# Patient Record
(demographics unavailable — no encounter records)

---

## 2025-06-24 NOTE — HISTORY OF PRESENT ILLNESS
[FreeTextEntry1] : REASON FOR VISIT: Ms. Sandra Davila is a 29-year-old  female of -[place] descent with a gestational age of 13 weeks and 3 days based on an MICHELLE of 2025 who was seen for genetic counseling through [facility] on 2025 to review her comprehensive carrier screening. The patient was accompanied by her sister.   RELEVANT MEDICAL AND SURGICAL HISTORY: - None    PREGNANCY HISTORY: Conception: Natural Bleeding/Spotting: Some spotting for two days - Illness/Infection: Denied  Fever: Denied Medications: Flucanazole, vitamins Alcohol: Denied Drugs/Smoking: Denied Other: Denied   COUNSELING NARRATIVE: #Carrier of Genetic Disorder The patient had comprehensive carrier screening done through [employer] for a 274 gene Horizon panel and the results were as follows:   Positive Carrier: 1. 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 Depending on the carrier status of the individuals partner, this couple may be at increased risk to have a child with Hemoglobin H Disease. 2. CARRIER for Sickle Cell Disease Positive for the pathogenic variant c.20A>T (p.E7V) in the HBB gene. Carriers of Sickle Cell Disease may be at risk for symptoms during physical exertion, high altitudes or when dehydrated.  3. NCREASED CARRIER RISK for Spinal Muscular Atrophy Two copies of the SMN1 gene detected. Positive for the [id number] variant. Based on this individuals reported ethnicity, the individual has a 1 in 34 risk to be a silent (2+0) carrier for SMA. If this individuals partner is a carrier for Spinal Muscular Atrophy, they may be at increased risk to have a child with this condition. Carrier screening for this individuals partner is suggested.   The clinical manifestations and inheritance pattern of these disorders were reviewed.   The patient's stated her partner lives in [facility] but she will be going to visit him in a month. She was given the option of taking a saliva kit with her, however, he would receive a bill from the lab unless he has an insurance that the lab accepts. We also discussed  screening. The patient said she would speak with her partner and would like to take a saliva kit home just in case.   The remaining genes were negative and reported no pathogenic variants detected. We reviewed the reason for carrier screening and the clinical significance. This patient was informed that based on the current test results, this couples chance to have a child with any of the remaining disorders tested are greatly reduced.   The patient was informed about the importance of sharing her carrier status with her family, as relatives confer a risk of being a carrier for this disorder/these disorders.   PRENATAL TESTING: We discussed the correlation between maternal age and Down syndrome, and the phenotype and natural history of Down syndrome and other common aneuploidies were reviewed. Because she will be 30 at delivery, her age-related risk for a baby with Down syndrome is 1 in 940 and her risk for a baby with any chromosomal abnormality, including Down syndrome, is 1 in 384.   The patient previously had cell-free fetal DNA screening performed for this pregnancy which was low risk for aneuploidies for the chromosomes studied (21, 18, 13, X, Y).   The patient was given the option of diagnostic testing.  The risks, benefits, and limitations of each option were explained including the 1/400 risk of miscarriage or other complication with invasive testing. Diagnostic testing also allows for microarray analysis. Unlike chorionic villus sampling (CVS) and amniocentesis which are diagnostic procedures, cell-free fetal DNA screening is a screening test with very high sensitivity and specificity for trisomies 21, 18, and 13. It can also detect sex chromosome aneuploidy and several microdeletion syndromes but with lower sensitivity and specificity. If cell-free fetal DNA screening is positive, ultrasound and diagnostic testing are recommended for confirmation.  If cell-free fetal DNA screening is negative, it is not a guarantee of an unaffected child.  She also has the option not to test.   The patient declined diagnostic testing at this time.   FAMILY HISTORY: A 3 generation pedigree was obtained and will be scanned into the medical record. Of note, we discussed the following significant family history:   The family history was otherwise negative for known significant birth defects, intellectual disability, consanguinity and known genetic diseases.  There is a 3-5% background risk for any birth defect or developmental issue with every pregnancy.   SUMMARY AND PLAN: - We reviewed the patient's comprehensive carrier screening results.  - No further testing was ordered today.   The patient expressed understanding and all of her questions were answered in detail. Approximately 60 minutes were spent on this encounter. If you have any additional questions, please feel free to call me at [phone] or [phone]. Thank you for referring this patient.   Sincerely,   Kavya Grant MS, [facility] Genetic Counselor

## 2025-06-24 NOTE — HISTORY OF PRESENT ILLNESS
[FreeTextEntry1] : REASON FOR VISIT: Ms. Sandra Davila is a 29-year-old  female of -American/[place] descent with a gestational age of 13 weeks and 3 days based on an MICHELLE of 2025 who was seen for genetic counseling through [facility] on 2025 to review her comprehensive carrier screening. The patient was accompanied by her sister.   RELEVANT MEDICAL AND SURGICAL HISTORY: - None    PREGNANCY HISTORY: Conception: Natural Bleeding/Spotting: Some spotting for two days - Illness/Infection: Denied  Fever: Denied Medications: Flucanazole, vitamins Alcohol: Denied Drugs/Smoking: Denied Other: Denied   COUNSELING NARRATIVE: #Carrier of Genetic Disorder The patient had comprehensive carrier screening done through [employer] for a 274 gene Horizon panel and the results were as follows:   Positive Carrier: 1. 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 Depending on the carrier status of the individuals partner, this couple may be at increased risk to have a child with Hemoglobin H Disease. 2. CARRIER for Sickle Cell Disease Positive for the pathogenic variant c.20A>T (p.E7V) in the HBB gene. Carriers of Sickle Cell Disease may be at risk for symptoms during physical exertion, high altitudes or when dehydrated.  3. NCREASED CARRIER RISK for Spinal Muscular Atrophy Two copies of the SMN1 gene detected. Positive for the [id number]>G variant. Based on this individuals reported ethnicity, the individual has a 1 in 34 risk to be a silent (2+0) carrier for SMA. If this individuals partner is a carrier for Spinal Muscular Atrophy, they may be at increased risk to have a child with this condition. Carrier screening for this individuals partner is suggested.   The clinical manifestations and inheritance pattern of these disorders were reviewed.   The patient's stated her partner lives in [facility] but she will be going to visit him in a month. She was given the option of taking a saliva kit with her, however, he would receive a bill from the lab unless he has an insurance that the lab accepts. We also discussed  screening. The patient said she would speak with her partner and would like to take a saliva kit home just in case.   The remaining genes were negative and reported no pathogenic variants detected. We reviewed the reason for carrier screening and the clinical significance. This patient was informed that based on the current test results, this couples chance to have a child with any of the remaining disorders tested are greatly reduced.   The patient was informed about the importance of sharing her carrier status with her family, as relatives confer a risk of being a carrier for this disorder/these disorders.   PRENATAL TESTING: We discussed the correlation between maternal age and Down syndrome, and the phenotype and natural history of Down syndrome and other common aneuploidies were reviewed. Because she will be 30 at delivery, her age-related risk for a baby with Down syndrome is 1 in 940 and her risk for a baby with any chromosomal abnormality, including Down syndrome, is 1 in 384.   The patient previously had cell-free fetal DNA screening performed for this pregnancy which was low risk for aneuploidies for the chromosomes studied (21, 18, 13, X, Y).   The patient was given the option of diagnostic testing.  The risks, benefits, and limitations of each option were explained including the 1/400 risk of miscarriage or other complication with invasive testing. Diagnostic testing also allows for microarray analysis. Unlike chorionic villus sampling (CVS) and amniocentesis which are diagnostic procedures, cell-free fetal DNA screening is a screening test with very high sensitivity and specificity for trisomies 21, 18, and 13. It can also detect sex chromosome aneuploidy and several microdeletion syndromes but with lower sensitivity and specificity. If cell-free fetal DNA screening is positive, ultrasound and diagnostic testing are recommended for confirmation.  If cell-free fetal DNA screening is negative, it is not a guarantee of an unaffected child.  She also has the option not to test.   The patient declined diagnostic testing at this time.   FAMILY HISTORY: A 3 generation pedigree was obtained and will be scanned into the medical record. Of note, we discussed the following significant family history:   The family history was otherwise negative for known significant birth defects, intellectual disability, consanguinity and known genetic diseases.  There is a 3-5% background risk for any birth defect or developmental issue with every pregnancy.   SUMMARY AND PLAN: - We reviewed the patient's comprehensive carrier screening results.  - No further testing was ordered today.   The patient expressed understanding and all of her questions were answered in detail. Approximately 60 minutes were spent on this encounter. If you have any additional questions, please feel free to call me at [phone] or [phone]. Thank you for referring this patient.   Sincerely,   Kavya Grant MS, [facility] Genetic Counselor